# Patient Record
(demographics unavailable — no encounter records)

---

## 2025-07-08 NOTE — PHYSICAL EXAM
[Appropriately responsive] : appropriately responsive [Alert] : alert [No Acute Distress] : no acute distress [Soft] : soft [Non-tender] : non-tender [Non-distended] : non-distended [No HSM] : No HSM [No Lesions] : no lesions [No Mass] : no mass [Oriented x3] : oriented x3 [Examination Of The Breasts] : a normal appearance [No Masses] : no breast masses were palpable [Labia Majora] : normal [Labia Minora] : normal [Normal] : normal [Uterine Adnexae] : normal [FreeTextEntry2] : 1 small wart noted, right labia  3mm [FreeTextEntry9] : 7 perianal warts noted

## 2025-07-08 NOTE — HISTORY OF PRESENT ILLNESS
[FreeTextEntry1] : 67 yo here for av. . She denies Pmb, no Judy.  H/o genital warts. She thinsk she has some around anus. Due for bone density, mammography. 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 Her father had colon cancer-pt had colonoscopy 12/2023 [ColonoscopyDate] : 2023

## 2025-07-08 NOTE — DISCUSSION/SUMMARY
[FreeTextEntry1] : Well woman exam pap done mammo ordered bone density recommended taking vit. D 2000 units daily and through diet obtain 1200 mg of calcium along with 2.5 hours of weight bearing exercise per week return in 1 year  perianal condyloma-treated with TCA-rt in 2 weeks